# Patient Record
Sex: FEMALE | Race: WHITE | NOT HISPANIC OR LATINO | URBAN - METROPOLITAN AREA
[De-identification: names, ages, dates, MRNs, and addresses within clinical notes are randomized per-mention and may not be internally consistent; named-entity substitution may affect disease eponyms.]

---

## 2023-05-05 ENCOUNTER — NURSING HOME VISIT (OUTPATIENT)
Dept: POST ACUTE CARE | Facility: EXTERNAL LOCATION | Age: 78
End: 2023-05-05
Payer: MEDICARE

## 2023-05-05 DIAGNOSIS — R53.81 PHYSICAL DECONDITIONING: ICD-10-CM

## 2023-05-05 DIAGNOSIS — Z86.73 H/O: CVA (CEREBROVASCULAR ACCIDENT): ICD-10-CM

## 2023-05-05 DIAGNOSIS — E11.59 TYPE 2 DIABETES MELLITUS WITH OTHER CIRCULATORY COMPLICATION, UNSPECIFIED WHETHER LONG TERM INSULIN USE (MULTI): Primary | ICD-10-CM

## 2023-05-05 DIAGNOSIS — I10 HTN (HYPERTENSION), BENIGN: ICD-10-CM

## 2023-05-05 DIAGNOSIS — E03.9 HYPOTHYROIDISM, UNSPECIFIED TYPE: ICD-10-CM

## 2023-05-05 PROCEDURE — 99310 SBSQ NF CARE HIGH MDM 45: CPT | Performed by: NURSE PRACTITIONER

## 2023-05-05 NOTE — LETTER
"Patient: Rhona Betancur  : 1945    Encounter Date: 2023    Name: Rhona Betancur  YOB: 1945    INITIAL NP FOLLOW UP VISIT:    The patient is a 77-year-old female who is here at [facility] after hospitalization.  Patient has a past medical history of small cell lung cancer, diabetes type 2, OA, CVA with PEG tube, hypothyroidism, hypertension, hyperlipidemia and dysphagia.  The patient presented to the emergency room from home with complaints of chest pain.  Patient lives at home with her  she is wheelchair-bound.  While in the emergency room the patient denies having chest pain and wanted to go home.  Troponins were negative and no ischemic changes found on EKG.  Patient did have a left large pleural effusion CT of the chest was negative for PE.  Patient had a gastrostomy tube exchange performed on May 1.  Patient had a thoracentesis per pulmonology.  Patient was seen by palliative care.  No changes were made to CODE STATUS.  At this time the patient is resting in bed.  She opens her eyes when her name is called but does not speak.  Patient falls back asleep easily.  She appears to be comfortable and in no distress.  Head of the bed is elevated.  Tube feed is in progress.         REVIEW OF SYSTEMS:  Review of systems are negative except where noted in HPI.    /64   Pulse 64   Temp 36.8 °C (98.3 °F)   Resp 18   Ht 1.6 m (5' 3\")   Wt 77.6 kg (171 lb)   BMI 30.29 kg/m²      Physical Exam  Constitutional:       Comments: Chronically ill   HENT:      Head: Normocephalic.   Eyes:      Conjunctiva/sclera: Conjunctivae normal.   Cardiovascular:      Rate and Rhythm: Normal rate and regular rhythm.      Pulses: Normal pulses.      Heart sounds: Normal heart sounds.   Pulmonary:      Effort: Pulmonary effort is normal.      Comments: Diminished bilaterally  Abdominal:      General: Bowel sounds are normal. There is no distension.      Palpations: Abdomen is soft.      Tenderness: " There is no abdominal tenderness.   Genitourinary:     Comments: Not examined  Musculoskeletal:      Cervical back: Neck supple.   Skin:     General: Skin is warm and dry.      Capillary Refill: Capillary refill takes less than 2 seconds.   Neurological:      Mental Status: Mental status is at baseline.   Psychiatric:      Comments: Resting quietly          CODE STATUS: Full code    DISCHARGE PLANNING: no date at this time  Discharge Home when goals are met.   Follow up with PCP in 1-2 weeks after discharge from facility.   [facility] to follow after discharge for continued PT/OT and Nursing.    RECENT HOSPITALIZATION DATES: 4/28 - 5/2/23  All laboratories, diagnostic tests, progress notes, and consultation notes reviewed from recent hospitalization.     LABORATORIES OR DIAGNOSTIC TESTS DONE/REVIEWED IN FACILITY:  5/4/23  CMP-COMPREHENSIVE METABOLIC PNL DELIA  ~ GLUCOSE 186 H  GLUCOSE, FASTING 65-99 mg/dL  GLUCOSE, NON-FASTING  mg/dL  SODIUM 143 136-145 mEq/L  POTASSIUM 4.1 3.5-5.3 mEq/L  CHLORIDE 99  mEq/L  ~ CARBON DIOXIDE (CO2) 35 H 21-33 mEq/L  ~ BUN (UREA NITROGEN) 30 H 7-25 mg/dL  ~ CREATININE 0.5 L 0.6-1.2 mg/dL  ~ BUN/CREATININE RATIO 60 H 6-22  GFR-AFRICAN AMERICAN 145 >60 mL/min/1.73 m2  GFR-NON-AFRICAN AMERICAN 120 >60 mL/min/1.73 m2   Stage of CKD eGFR (mL/min/1.73 square meters)   Stage 1 >/= 90 or > 90   Stage 2 60 - 89   Stage 3 30 - 59   Stage 4 15 - 29   Stage 5 </= 14 or < 15  ** GFR is reliable for adults 17 to 69 years with stable kidney   function.  ~ CALCIUM 8.0 L 8.4-10.2 mg/dL  ~ PROTEIN, TOTAL 5.4 L 6.0-8.3 g/dL  ~ ALBUMIN 2.8 L 3.5-5.5 g/dL  A/G RATIO 1.1 1.0-2.3  ALKALINE PHOS 54  IU/L  AST (SGOT) 14 4-40 IU/L  ALT (SGPT) 9 4-55 IU/L  BILIRUBIN, TOTAL 0.4 0.2-1.2 mg/dL  HEMATOLOGY  WHITE BLOOD COUNT 6.5 DELIA 4.5-10.8 K/cmm  RED BLOOD COUNT 3.96 DELIA 3.90-5.40 M/cmm  HEMOGLOBIN (HGB) 12.2 DELIA 12.0-16.0 g/dL  HEMATOCRIT (HCT) 38.2 DELIA 36.0-48.0 %  MCV 96.3 DELIA 80.0-100.0 fL  MCH 30.9  DELIA 26.0-35.0 pg  MCHC 32.1 DELIA 31.0-36.5 g/dL  RDW 16.5 H DELIA 11.0-16.0 %  PLATELET 118 L DELIA 150-450 K/cmm  MPV 10.0 DELIA 6.5-12.0 fL  NEUTROPHILS 58.5 DELIA 40.0-80.0 %  LYMPHS 30.9 DELIA 13.0-48.0 %  MONOCYTES 7.5 DELIA 2.0-12.0 %  EOS 2.4 DELIA 0.0-8.0 %  BASO 0.7 DELIA 0.0-2.0 %  NEUTS(ABS) 3.80 DELIA 1.50-7.60 K/uL  LYMPHS(ABS) 2.00 DELIA 0.90-5.50 K/uL  MONOS(ABS) 0.50 DELIA 0.15-1.10 K/uL  EOS(ABS) 0.20 DELIA 0.20-0.80 K/uL  ANISOCYTOSIS 1+ ABN DELIA NEGATIVE  THYROIDS  #5 TSH 7.870 RH DELIA 0.340-5.500 uIU/mL  OTHER  #3 GLYCO-HGBAIC 7.5 H DELIA 4.1-6.1 %  #4 MEAN BL GLUCOSE 169 H DELIA  mg/dL    MEDICATIONS REVIEWED AT THE FACILITY:  Colace 100 mg twice daily  Amlodipine 5 mg daily  Glargine 12 units at bedtime  Atorvastatin 40 mg nightly  Plavix 75 mg daily  Lasix 20 mg daily  Prednisone 50 mg daily  Gabapentin 100 mg 3 times a day  Sliding scale insulin every 6  Sertraline 50 mg daily  Tylenol 650 mg every 4 hours as needed  Levothyroxine 100 mcg daily  N.p.o. tube feed    Assessment/Plan   Problem List Items Addressed This Visit          Other    Physical deconditioning     Other Visit Diagnoses       Type 2 diabetes mellitus with other circulatory complication, unspecified whether long term insulin use (CMS/[facility])    -  Primary    H/O: CVA (cerebrovascular accident)        HTN (hypertension), benign        Hypothyroidism, unspecified type                Skin Integrity:  Nursing to monitor skin integrity as patient is at risk for pressure injuries.    PLAN:   Recent nursing evaluation and notes were reviewed.   Living will related issues reviewed-Code status: Full code  Overall, patient is stable despite his/her chronic conditions.    #Chest pain: ACS was ruled out  #Large left pleural effusion/Hypercapnia: CT was negative for PE left pleural effusion increasing noted. Chest x-ray findings appear to show chronic changes due to known small cell lung cancer.  Pulmonary saw in the hospital and per pulmonary's notes patient is  not symptomatic and should consider palliative care or hospice.  Palliative care recommended, however patient is a full code.  #Dysfunction of PEG tube: PEG tube changed out, patient did receive IV doxycycline as PEG tube site appeared to be in poor condition due to poor care prior to hospitalization  #Remote CVA, dysphagia aphasia extreme deconditioning:  N.p.o. tube feeds wheelchair-bound  #Diabetes type 2:  Sliding scale insulin coverage every 6 hours and Glargine daily  #Hypertension: Antihypertensives in place  #Hyperlipidemia:  No statin due to intolerance  #Hypothyroidism:  Synthroid dose increased, repeat TSH in 4-6 wks  Galarza to GOSIA  Any decline or change in condition needs to be communicated with the physician or myself.    Discussion with nursing staff regarding ongoing care and management.  If needed, would communicate with family who are not present at this time.   There are no concerns at this time.    We will continue with the medications noted above.    We will continue to follow the patient here at the facility.    *Please note that nursing facility, outside laboratory agency, and  [facility] do not interface.     Completion of the note was done through Dragon voice recognition technology and may include   unintended or grammatical errors which may not have been recognized when finalizing the note.     Time: I spent 45 minutes or greater with the patient. Greater than 50% of this time was spent in counseling and or coordination of care. The time includes prep time of reviewing vital signs, report from direct nursing staff and or therapists, hospital documentation, reviewing labs, radiographs, diagnostic tests and or consultations, time directly spent with the patient interviewing, examining, and education regarding diagnosis, treatments, and medications, as well as documentation in the electronic medical record, and reviewing the plan of care and any new orders with the patient, nursing staff and other  staff directly related to the patients care.       ASHWIN Mcqueen       Electronically Signed By: ASHWIN Mcqueen   5/9/23  9:39 AM

## 2023-05-08 VITALS
HEART RATE: 64 BPM | WEIGHT: 171 LBS | HEIGHT: 63 IN | TEMPERATURE: 98.3 F | DIASTOLIC BLOOD PRESSURE: 64 MMHG | BODY MASS INDEX: 30.3 KG/M2 | RESPIRATION RATE: 18 BRPM | SYSTOLIC BLOOD PRESSURE: 122 MMHG

## 2023-05-08 NOTE — PROGRESS NOTES
"Name: Rhona Betancur  YOB: 1945    INITIAL NP FOLLOW UP VISIT:    The patient is a 77-year-old female who is here at [facility] after hospitalization.  Patient has a past medical history of small cell lung cancer, diabetes type 2, OA, CVA with PEG tube, hypothyroidism, hypertension, hyperlipidemia and dysphagia.  The patient presented to the emergency room from home with complaints of chest pain.  Patient lives at home with her  she is wheelchair-bound.  While in the emergency room the patient denies having chest pain and wanted to go home.  Troponins were negative and no ischemic changes found on EKG.  Patient did have a left large pleural effusion CT of the chest was negative for PE.  Patient had a gastrostomy tube exchange performed on May 1.  Patient had a thoracentesis per pulmonology.  Patient was seen by palliative care.  No changes were made to CODE STATUS.  At this time the patient is resting in bed.  She opens her eyes when her name is called but does not speak.  Patient falls back asleep easily.  She appears to be comfortable and in no distress.  Head of the bed is elevated.  Tube feed is in progress.         REVIEW OF SYSTEMS:  Review of systems are negative except where noted in HPI.    /64   Pulse 64   Temp 36.8 °C (98.3 °F)   Resp 18   Ht 1.6 m (5' 3\")   Wt 77.6 kg (171 lb)   BMI 30.29 kg/m²      Physical Exam  Constitutional:       Comments: Chronically ill   HENT:      Head: Normocephalic.   Eyes:      Conjunctiva/sclera: Conjunctivae normal.   Cardiovascular:      Rate and Rhythm: Normal rate and regular rhythm.      Pulses: Normal pulses.      Heart sounds: Normal heart sounds.   Pulmonary:      Effort: Pulmonary effort is normal.      Comments: Diminished bilaterally  Abdominal:      General: Bowel sounds are normal. There is no distension.      Palpations: Abdomen is soft.      Tenderness: There is no abdominal tenderness.   Genitourinary:     Comments: Not " examined  Musculoskeletal:      Cervical back: Neck supple.   Skin:     General: Skin is warm and dry.      Capillary Refill: Capillary refill takes less than 2 seconds.   Neurological:      Mental Status: Mental status is at baseline.   Psychiatric:      Comments: Resting quietly          CODE STATUS: Full code    DISCHARGE PLANNING: no date at this time  Discharge Home when goals are met.   Follow up with PCP in 1-2 weeks after discharge from facility.   [facility] to follow after discharge for continued PT/OT and Nursing.    RECENT HOSPITALIZATION DATES: 4/28 - 5/2/23  All laboratories, diagnostic tests, progress notes, and consultation notes reviewed from recent hospitalization.     LABORATORIES OR DIAGNOSTIC TESTS DONE/REVIEWED IN FACILITY:  5/4/23  CMP-COMPREHENSIVE METABOLIC PNL DELIA  ~ GLUCOSE 186 H  GLUCOSE, FASTING 65-99 mg/dL  GLUCOSE, NON-FASTING  mg/dL  SODIUM 143 136-145 mEq/L  POTASSIUM 4.1 3.5-5.3 mEq/L  CHLORIDE 99  mEq/L  ~ CARBON DIOXIDE (CO2) 35 H 21-33 mEq/L  ~ BUN (UREA NITROGEN) 30 H 7-25 mg/dL  ~ CREATININE 0.5 L 0.6-1.2 mg/dL  ~ BUN/CREATININE RATIO 60 H 6-22  GFR-AFRICAN AMERICAN 145 >60 mL/min/1.73 m2  GFR-NON-AFRICAN AMERICAN 120 >60 mL/min/1.73 m2   Stage of CKD eGFR (mL/min/1.73 square meters)   Stage 1 >/= 90 or > 90   Stage 2 60 - 89   Stage 3 30 - 59   Stage 4 15 - 29   Stage 5 </= 14 or < 15  ** GFR is reliable for adults 17 to 69 years with stable kidney   function.  ~ CALCIUM 8.0 L 8.4-10.2 mg/dL  ~ PROTEIN, TOTAL 5.4 L 6.0-8.3 g/dL  ~ ALBUMIN 2.8 L 3.5-5.5 g/dL  A/G RATIO 1.1 1.0-2.3  ALKALINE PHOS 54  IU/L  AST (SGOT) 14 4-40 IU/L  ALT (SGPT) 9 4-55 IU/L  BILIRUBIN, TOTAL 0.4 0.2-1.2 mg/dL  HEMATOLOGY  WHITE BLOOD COUNT 6.5 DELIA 4.5-10.8 K/cmm  RED BLOOD COUNT 3.96 DELIA 3.90-5.40 M/cmm  HEMOGLOBIN (HGB) 12.2 DELIA 12.0-16.0 g/dL  HEMATOCRIT (HCT) 38.2 DELIA 36.0-48.0 %  MCV 96.3 DELIA 80.0-100.0 fL  MCH 30.9 DELIA 26.0-35.0 pg  MCHC 32.1 DELIA 31.0-36.5 g/dL  RDW 16.5 H DELIA  11.0-16.0 %  PLATELET 118 L DELIA 150-450 K/cmm  MPV 10.0 DELIA 6.5-12.0 fL  NEUTROPHILS 58.5 DELIA 40.0-80.0 %  LYMPHS 30.9 DELIA 13.0-48.0 %  MONOCYTES 7.5 DELIA 2.0-12.0 %  EOS 2.4 DELIA 0.0-8.0 %  BASO 0.7 DELIA 0.0-2.0 %  NEUTS(ABS) 3.80 DELIA 1.50-7.60 K/uL  LYMPHS(ABS) 2.00 DELIA 0.90-5.50 K/uL  MONOS(ABS) 0.50 DELIA 0.15-1.10 K/uL  EOS(ABS) 0.20 DELIA 0.20-0.80 K/uL  ANISOCYTOSIS 1+ ABN DELIA NEGATIVE  THYROIDS  #5 TSH 7.870 RH DELIA 0.340-5.500 uIU/mL  OTHER  #3 GLYCO-HGBAIC 7.5 H DELIA 4.1-6.1 %  #4 MEAN BL GLUCOSE 169 H DELIA  mg/dL    MEDICATIONS REVIEWED AT THE FACILITY:  Colace 100 mg twice daily  Amlodipine 5 mg daily  Glargine 12 units at bedtime  Atorvastatin 40 mg nightly  Plavix 75 mg daily  Lasix 20 mg daily  Prednisone 50 mg daily  Gabapentin 100 mg 3 times a day  Sliding scale insulin every 6  Sertraline 50 mg daily  Tylenol 650 mg every 4 hours as needed  Levothyroxine 100 mcg daily  N.p.o. tube feed    Assessment/Plan    Problem List Items Addressed This Visit          Other    Physical deconditioning     Other Visit Diagnoses       Type 2 diabetes mellitus with other circulatory complication, unspecified whether long term insulin use (CMS/[facility])    -  Primary    H/O: CVA (cerebrovascular accident)        HTN (hypertension), benign        Hypothyroidism, unspecified type                Skin Integrity:  Nursing to monitor skin integrity as patient is at risk for pressure injuries.    PLAN:   Recent nursing evaluation and notes were reviewed.   Living will related issues reviewed-Code status: Full code  Overall, patient is stable despite his/her chronic conditions.    #Chest pain: ACS was ruled out  #Large left pleural effusion/Hypercapnia: CT was negative for PE left pleural effusion increasing noted. Chest x-ray findings appear to show chronic changes due to known small cell lung cancer.  Pulmonary saw in the hospital and per pulmonary's notes patient is not symptomatic and should consider palliative care or hospice.   Palliative care recommended, however patient is a full code.  #Dysfunction of PEG tube: PEG tube changed out, patient did receive IV doxycycline as PEG tube site appeared to be in poor condition due to poor care prior to hospitalization  #Remote CVA, dysphagia aphasia extreme deconditioning:  N.p.o. tube feeds wheelchair-bound  #Diabetes type 2:  Sliding scale insulin coverage every 6 hours and Glargine daily  #Hypertension: Antihypertensives in place  #Hyperlipidemia:  No statin due to intolerance  #Hypothyroidism:  Synthroid dose increased, repeat TSH in 4-6 wks  Galarza to CD  Any decline or change in condition needs to be communicated with the physician or myself.    Discussion with nursing staff regarding ongoing care and management.  If needed, would communicate with family who are not present at this time.   There are no concerns at this time.    We will continue with the medications noted above.    We will continue to follow the patient here at the facility.    *Please note that nursing facility, outside laboratory agency, and  [facility] do not interface.     Completion of the note was done through Dragon voice recognition technology and may include   unintended or grammatical errors which may not have been recognized when finalizing the note.     Time: I spent 45 minutes or greater with the patient. Greater than 50% of this time was spent in counseling and or coordination of care. The time includes prep time of reviewing vital signs, report from direct nursing staff and or therapists, hospital documentation, reviewing labs, radiographs, diagnostic tests and or consultations, time directly spent with the patient interviewing, examining, and education regarding diagnosis, treatments, and medications, as well as documentation in the electronic medical record, and reviewing the plan of care and any new orders with the patient, nursing staff and other staff directly related to the patients care.       Marisel Reyes,  APRN-CNP

## 2023-05-09 PROBLEM — R53.81 PHYSICAL DECONDITIONING: Status: ACTIVE | Noted: 2023-05-09

## 2023-05-09 PROBLEM — I10 HTN (HYPERTENSION): Status: ACTIVE | Noted: 2023-05-09

## 2023-05-09 PROBLEM — I63.9 CVA (CEREBRAL VASCULAR ACCIDENT) (MULTI): Status: ACTIVE | Noted: 2023-05-09

## 2023-05-16 ENCOUNTER — NURSING HOME VISIT (OUTPATIENT)
Dept: POST ACUTE CARE | Facility: EXTERNAL LOCATION | Age: 78
End: 2023-05-16
Payer: MEDICARE

## 2023-05-16 DIAGNOSIS — I63.9 CEREBROVASCULAR ACCIDENT (CVA), UNSPECIFIED MECHANISM (MULTI): ICD-10-CM

## 2023-05-16 DIAGNOSIS — E11.69 TYPE 2 DIABETES MELLITUS WITH OTHER SPECIFIED COMPLICATION, UNSPECIFIED WHETHER LONG TERM INSULIN USE (MULTI): Primary | ICD-10-CM

## 2023-05-16 DIAGNOSIS — I10 PRIMARY HYPERTENSION: ICD-10-CM

## 2023-05-16 DIAGNOSIS — R53.81 PHYSICAL DECONDITIONING: ICD-10-CM

## 2023-05-16 PROCEDURE — 99316 NF DSCHRG MGMT 30 MIN+: CPT | Performed by: NURSE PRACTITIONER

## 2023-05-16 NOTE — LETTER
"Patient: Rhona Betancur  : 1945    Encounter Date: 2023    Name: Rhona Betancur  YOB: 1945    Discharge VISIT:     SUBJECTIVE:  The patient is resting in bed. She opens eyes when name is called. She appears comfortable and in no distress. She unfortunately was cut from insurance and will be going home in the next couple days. I am not able to have meaningful conversation as patient does not speak. Nursing reporting minimal speech and no concerns.     REVIEW OF SYSTEMS:   All review of systems are negative unless otherwise stated above under subjective.    LABS REVIEWED AT FACILITY:  No new labs or testing    MEDICATIONS REVIEWED AT FACILITY:  Colace 100 mg twice daily  Amlodipine 5 mg daily  Glargine 12 units at bedtime  Atorvastatin 40 mg nightly  Plavix 75 mg daily  Lasix 20 mg daily  Prednisone 50 mg daily  Gabapentin 100 mg 3 times a day  Sliding scale insulin every 6  Sertraline 50 mg daily  Tylenol 650 mg every 4 hours as needed  Levothyroxine 100 mcg daily  N.p.o. tube feed  OBJECTIVE:  /72   Pulse 78   Temp 36.7 °C (98 °F)   Resp 18   Ht 1.6 m (5' 3\")   Wt 80.5 kg (177 lb 8 oz)   BMI 31.44 kg/m²     Physical Exam  Constitutional:       Comments: Chronically ill   HENT:      Head: Normocephalic.   Eyes:      Conjunctiva/sclera: Conjunctivae normal.   Cardiovascular:      Rate and Rhythm: Normal rate and regular rhythm.      Pulses: Normal pulses.      Heart sounds: Normal heart sounds.   Pulmonary:      Effort: Pulmonary effort is normal.      Comments: Diminished bilaterally  Abdominal:      General: Bowel sounds are normal. There is no distension.      Palpations: Abdomen is soft.      Tenderness: There is no abdominal tenderness.   Genitourinary:     Comments: Not examined  Musculoskeletal:      Cervical back: Neck supple.   Skin:     General: Skin is warm and dry.      Capillary Refill: Capillary refill takes less than 2 seconds.   Neurological:      Mental Status: Mental " status is at baseline.   Psychiatric:      Comments: Resting quietly         Assessment/Plan  Problem List Items Addressed This Visit          Nervous    CVA (cerebral vascular accident) (CMS/[facility])       Circulatory    HTN (hypertension)       Other    Physical deconditioning     Other Visit Diagnoses       Type 2 diabetes mellitus with other specified complication, unspecified whether long term insulin use (CMS/[facility])    -  Primary            Skin integrity:  Nursing to monitor skin integrity as patient is at risk for pressure injuries.  Wound care per nursing    PLAN:  Pt has been seen for follow up and discharge visit.  The patient is here at facility for PT/OT/ST to maximize strength, function, endurance and safety.  The patient is participating in therapy. Rhona Betancur is making progress to the best of his/her ability.   Please see PT/OT/ST notes in the facility for detailed information regarding progression of patients progress.   Recent nursing evaluation and notes were reviewed.   Living will related issues reviewed-Code status: Full code  Overall, patient is stable despite his/her chronic conditions.    #DM2: BS reviewed - last 170, Accu checks/SSI and Glargine cont   #HTN: Readings reviewed and controlled, cont amlodipine and lasix  #CVA: on atorvastatin and plavix, cont therapy  #Insurance cut and patient will be going home. Nursing to discuss discharge instructions regarding meds w/family, TF, importance of compliance w/meds, f/up appts and safety in the home.  Any decline or change in condition needs to be communicated with the physician or myself.    Discussion with nursing staff regarding ongoing care and management.  If needed, would communicate with family who are not present at this time.   There are no concerns at this time.  We will continue with the medications noted above.    We will continue to follow the patient here at the facility.    Discharge planning: tomorrow or Thursday  Discharge Home when  goals are met.   Follow up with PCP in 1-2 weeks after discharge from facility.   [facility] to follow after discharge for continued PT/OT and Nursing.    *Please note that nursing facility, outside laboratory agency, and  [facility] do not interface.     Completion of the note was done through Dragon voice recognition technology and may include   unintended or grammatical errors which may not have been recognized when finalizing the note.     Time: I spent 31 minutes or greater with the patient reviewing discharge information which include compliance of medications, follow up appointments with PCP and or Speciality physicians. Greater than 50% of this time was spent in counseling and or coordination of care.        ASHWIN Mcqueen      Electronically Signed By: ASHWIN Mcqueen   5/21/23  4:52 PM

## 2023-05-17 ENCOUNTER — OFFICE VISIT (OUTPATIENT)
Dept: PALLATIVE CARE | Age: 78
End: 2023-05-17
Payer: MEDICARE

## 2023-05-17 VITALS
DIASTOLIC BLOOD PRESSURE: 64 MMHG | SYSTOLIC BLOOD PRESSURE: 111 MMHG | RESPIRATION RATE: 18 BRPM | TEMPERATURE: 98.7 F | OXYGEN SATURATION: 98 % | HEART RATE: 68 BPM

## 2023-05-17 DIAGNOSIS — I63.9 CEREBROVASCULAR ACCIDENT (CVA), UNSPECIFIED MECHANISM (HCC): Primary | ICD-10-CM

## 2023-05-17 DIAGNOSIS — Z51.5 PALLIATIVE CARE ENCOUNTER: ICD-10-CM

## 2023-05-17 DIAGNOSIS — R13.10 DYSPHAGIA, UNSPECIFIED TYPE: ICD-10-CM

## 2023-05-17 DIAGNOSIS — C34.90 MALIGNANT NEOPLASM OF LUNG, UNSPECIFIED LATERALITY, UNSPECIFIED PART OF LUNG (HCC): ICD-10-CM

## 2023-05-17 DIAGNOSIS — Z71.89 ADVANCED CARE PLANNING/COUNSELING DISCUSSION: ICD-10-CM

## 2023-05-17 DIAGNOSIS — Z93.1 STATUS POST INSERTION OF PERCUTANEOUS ENDOSCOPIC GASTROSTOMY (PEG) TUBE (HCC): ICD-10-CM

## 2023-05-17 DIAGNOSIS — Z71.89 GOALS OF CARE, COUNSELING/DISCUSSION: ICD-10-CM

## 2023-05-17 PROCEDURE — 1123F ACP DISCUSS/DSCN MKR DOCD: CPT

## 2023-05-17 PROCEDURE — 99306 1ST NF CARE HIGH MDM 50: CPT

## 2023-05-17 RX ORDER — GABAPENTIN 100 MG/1
100 CAPSULE ORAL 3 TIMES DAILY
COMMUNITY

## 2023-05-17 RX ORDER — CLOPIDOGREL BISULFATE 75 MG/1
75 TABLET ORAL DAILY
COMMUNITY

## 2023-05-17 RX ORDER — ACETAMINOPHEN 325 MG/1
650 TABLET ORAL EVERY 6 HOURS PRN
COMMUNITY

## 2023-05-17 RX ORDER — NYSTATIN 100000 [USP'U]/G
POWDER TOPICAL 4 TIMES DAILY
COMMUNITY

## 2023-05-17 RX ORDER — FUROSEMIDE 20 MG/1
20 TABLET ORAL DAILY
COMMUNITY

## 2023-05-17 RX ORDER — AMLODIPINE BESYLATE 5 MG/1
5 TABLET ORAL DAILY
COMMUNITY

## 2023-05-17 RX ORDER — LEVOTHYROXINE SODIUM 88 UG/1
88 TABLET ORAL DAILY
COMMUNITY
End: 2023-05-17

## 2023-05-17 RX ORDER — DOCUSATE SODIUM 100 MG/1
100 CAPSULE, LIQUID FILLED ORAL 2 TIMES DAILY
COMMUNITY

## 2023-05-17 RX ORDER — LEVOTHYROXINE SODIUM 0.1 MG/1
100 TABLET ORAL DAILY
COMMUNITY

## 2023-05-17 RX ORDER — ATORVASTATIN CALCIUM 40 MG/1
40 TABLET, FILM COATED ORAL DAILY
COMMUNITY

## 2023-05-17 RX ORDER — INSULIN GLARGINE 100 [IU]/ML
INJECTION, SOLUTION SUBCUTANEOUS NIGHTLY
COMMUNITY

## 2023-05-17 RX ORDER — PREDNISONE 50 MG/1
50 TABLET ORAL DAILY
COMMUNITY

## 2023-05-20 VITALS
SYSTOLIC BLOOD PRESSURE: 124 MMHG | HEART RATE: 78 BPM | RESPIRATION RATE: 18 BRPM | BODY MASS INDEX: 31.45 KG/M2 | DIASTOLIC BLOOD PRESSURE: 72 MMHG | HEIGHT: 63 IN | WEIGHT: 177.5 LBS | TEMPERATURE: 98 F

## 2023-05-20 NOTE — PROGRESS NOTES
"Name: Rhona Betancur  YOB: 1945    Discharge VISIT:     SUBJECTIVE:  The patient is resting in bed. She opens eyes when name is called. She appears comfortable and in no distress. She unfortunately was cut from insurance and will be going home in the next couple days. I am not able to have meaningful conversation as patient does not speak. Nursing reporting minimal speech and no concerns.     REVIEW OF SYSTEMS:   All review of systems are negative unless otherwise stated above under subjective.    LABS REVIEWED AT FACILITY:  No new labs or testing    MEDICATIONS REVIEWED AT FACILITY:  Colace 100 mg twice daily  Amlodipine 5 mg daily  Glargine 12 units at bedtime  Atorvastatin 40 mg nightly  Plavix 75 mg daily  Lasix 20 mg daily  Prednisone 50 mg daily  Gabapentin 100 mg 3 times a day  Sliding scale insulin every 6  Sertraline 50 mg daily  Tylenol 650 mg every 4 hours as needed  Levothyroxine 100 mcg daily  N.p.o. tube feed  OBJECTIVE:  /72   Pulse 78   Temp 36.7 °C (98 °F)   Resp 18   Ht 1.6 m (5' 3\")   Wt 80.5 kg (177 lb 8 oz)   BMI 31.44 kg/m²     Physical Exam  Constitutional:       Comments: Chronically ill   HENT:      Head: Normocephalic.   Eyes:      Conjunctiva/sclera: Conjunctivae normal.   Cardiovascular:      Rate and Rhythm: Normal rate and regular rhythm.      Pulses: Normal pulses.      Heart sounds: Normal heart sounds.   Pulmonary:      Effort: Pulmonary effort is normal.      Comments: Diminished bilaterally  Abdominal:      General: Bowel sounds are normal. There is no distension.      Palpations: Abdomen is soft.      Tenderness: There is no abdominal tenderness.   Genitourinary:     Comments: Not examined  Musculoskeletal:      Cervical back: Neck supple.   Skin:     General: Skin is warm and dry.      Capillary Refill: Capillary refill takes less than 2 seconds.   Neurological:      Mental Status: Mental status is at baseline.   Psychiatric:      Comments: Resting quietly "         Assessment/Plan   Problem List Items Addressed This Visit          Nervous    CVA (cerebral vascular accident) (CMS/[facility])       Circulatory    HTN (hypertension)       Other    Physical deconditioning     Other Visit Diagnoses       Type 2 diabetes mellitus with other specified complication, unspecified whether long term insulin use (CMS/[facility])    -  Primary            Skin integrity:  Nursing to monitor skin integrity as patient is at risk for pressure injuries.  Wound care per nursing    PLAN:  Pt has been seen for follow up and discharge visit.  The patient is here at facility for PT/OT/ST to maximize strength, function, endurance and safety.  The patient is participating in therapy. Rhnoa Betancur is making progress to the best of his/her ability.   Please see PT/OT/ST notes in the facility for detailed information regarding progression of patients progress.   Recent nursing evaluation and notes were reviewed.   Living will related issues reviewed-Code status: Full code  Overall, patient is stable despite his/her chronic conditions.    #DM2: BS reviewed - last 170, Accu checks/SSI and Glargine cont   #HTN: Readings reviewed and controlled, cont amlodipine and lasix  #CVA: on atorvastatin and plavix, cont therapy  #Insurance cut and patient will be going home. Nursing to discuss discharge instructions regarding meds w/family, TF, importance of compliance w/meds, f/up appts and safety in the home.  Any decline or change in condition needs to be communicated with the physician or myself.    Discussion with nursing staff regarding ongoing care and management.  If needed, would communicate with family who are not present at this time.   There are no concerns at this time.  We will continue with the medications noted above.    We will continue to follow the patient here at the facility.    Discharge planning: tomorrow or Thursday  Discharge Home when goals are met.   Follow up with PCP in 1-2 weeks after discharge  from facility.   C to follow after discharge for continued PT/OT and Nursing.    *Please note that nursing facility, outside laboratory agency, and  [facility] do not interface.     Completion of the note was done through Dragon voice recognition technology and may include   unintended or grammatical errors which may not have been recognized when finalizing the note.     Time: I spent 31 minutes or greater with the patient reviewing discharge information which include compliance of medications, follow up appointments with PCP and or Speciality physicians. Greater than 50% of this time was spent in counseling and or coordination of care.        Marisel Reyes, APRN-CNP

## 2023-05-23 ASSESSMENT — ENCOUNTER SYMPTOMS
VOICE CHANGE: 0
NAUSEA: 1
COLOR CHANGE: 0
WHEEZING: 0
SHORTNESS OF BREATH: 0

## 2023-05-24 PROBLEM — I63.9 CVA (CEREBRAL VASCULAR ACCIDENT) (HCC): Status: ACTIVE | Noted: 2023-05-09

## 2023-05-24 ASSESSMENT — ENCOUNTER SYMPTOMS
DIARRHEA: 0
ABDOMINAL PAIN: 0
TROUBLE SWALLOWING: 1

## 2024-01-23 ENCOUNTER — LAB REQUISITION (OUTPATIENT)
Dept: LAB | Facility: LAB | Age: 79
End: 2024-01-23
Payer: MEDICARE

## 2024-01-23 DIAGNOSIS — N39.0 URINARY TRACT INFECTION, SITE NOT SPECIFIED: ICD-10-CM

## 2024-01-23 PROCEDURE — 87086 URINE CULTURE/COLONY COUNT: CPT

## 2024-01-23 PROCEDURE — 81001 URINALYSIS AUTO W/SCOPE: CPT

## 2024-01-24 LAB
AMORPH CRY #/AREA UR COMP ASSIST: ABNORMAL /HPF
APPEARANCE UR: ABNORMAL
BACTERIA #/AREA URNS AUTO: ABNORMAL /HPF
BILIRUB UR STRIP.AUTO-MCNC: NEGATIVE MG/DL
COLOR UR: YELLOW
GLUCOSE UR STRIP.AUTO-MCNC: NEGATIVE MG/DL
HOLD SPECIMEN: NORMAL
KETONES UR STRIP.AUTO-MCNC: NEGATIVE MG/DL
LEUKOCYTE ESTERASE UR QL STRIP.AUTO: ABNORMAL
NITRITE UR QL STRIP.AUTO: NEGATIVE
PH UR STRIP.AUTO: 7 [PH]
PROT UR STRIP.AUTO-MCNC: ABNORMAL MG/DL
RBC # UR STRIP.AUTO: ABNORMAL /UL
RBC #/AREA URNS AUTO: ABNORMAL /HPF
SP GR UR STRIP.AUTO: 1
UROBILINOGEN UR STRIP.AUTO-MCNC: <2 MG/DL
WBC #/AREA URNS AUTO: ABNORMAL /HPF

## 2024-01-25 LAB — BACTERIA UR CULT: ABNORMAL
